# Patient Record
Sex: MALE | Race: WHITE | NOT HISPANIC OR LATINO | Employment: FULL TIME | URBAN - METROPOLITAN AREA
[De-identification: names, ages, dates, MRNs, and addresses within clinical notes are randomized per-mention and may not be internally consistent; named-entity substitution may affect disease eponyms.]

---

## 2019-09-16 ENCOUNTER — OFFICE VISIT (OUTPATIENT)
Dept: URGENT CARE | Facility: CLINIC | Age: 49
End: 2019-09-16
Payer: COMMERCIAL

## 2019-09-16 VITALS
OXYGEN SATURATION: 96 % | BODY MASS INDEX: 30.1 KG/M2 | SYSTOLIC BLOOD PRESSURE: 132 MMHG | WEIGHT: 215 LBS | HEART RATE: 82 BPM | TEMPERATURE: 98 F | RESPIRATION RATE: 19 BRPM | HEIGHT: 71 IN | DIASTOLIC BLOOD PRESSURE: 77 MMHG

## 2019-09-16 DIAGNOSIS — B34.9 VIRAL SYNDROME: Primary | ICD-10-CM

## 2019-09-16 PROCEDURE — 3008F BODY MASS INDEX DOCD: CPT | Mod: CPTII,S$GLB,, | Performed by: PHYSICIAN ASSISTANT

## 2019-09-16 PROCEDURE — 96372 PR INJECTION,THERAP/PROPH/DIAG2ST, IM OR SUBCUT: ICD-10-PCS | Mod: S$GLB,,, | Performed by: PHYSICIAN ASSISTANT

## 2019-09-16 PROCEDURE — 99213 PR OFFICE/OUTPT VISIT, EST, LEVL III, 20-29 MIN: ICD-10-PCS | Mod: 25,S$GLB,, | Performed by: PHYSICIAN ASSISTANT

## 2019-09-16 PROCEDURE — 99213 OFFICE O/P EST LOW 20 MIN: CPT | Mod: 25,S$GLB,, | Performed by: PHYSICIAN ASSISTANT

## 2019-09-16 PROCEDURE — 96372 THER/PROPH/DIAG INJ SC/IM: CPT | Mod: S$GLB,,, | Performed by: PHYSICIAN ASSISTANT

## 2019-09-16 PROCEDURE — 3008F PR BODY MASS INDEX (BMI) DOCUMENTED: ICD-10-PCS | Mod: CPTII,S$GLB,, | Performed by: PHYSICIAN ASSISTANT

## 2019-09-16 RX ORDER — ALBUTEROL SULFATE 90 UG/1
2 AEROSOL, METERED RESPIRATORY (INHALATION) EVERY 6 HOURS PRN
Qty: 8 G | Refills: 0 | Status: SHIPPED | OUTPATIENT
Start: 2019-09-16 | End: 2023-09-29

## 2019-09-16 RX ORDER — DEXAMETHASONE SODIUM PHOSPHATE 100 MG/10ML
10 INJECTION INTRAMUSCULAR; INTRAVENOUS ONCE
Status: COMPLETED | OUTPATIENT
Start: 2019-09-16 | End: 2019-09-16

## 2019-09-16 RX ORDER — AZELASTINE 1 MG/ML
2 SPRAY, METERED NASAL 2 TIMES DAILY
Qty: 30 ML | Refills: 0 | Status: SHIPPED | OUTPATIENT
Start: 2019-09-16 | End: 2020-09-15

## 2019-09-16 RX ORDER — AZITHROMYCIN 250 MG/1
TABLET, FILM COATED ORAL
Qty: 6 TABLET | Refills: 0 | Status: SHIPPED | OUTPATIENT
Start: 2019-09-16 | End: 2019-09-21

## 2019-09-16 RX ORDER — PANTOPRAZOLE SODIUM 40 MG/1
TABLET, DELAYED RELEASE ORAL
Refills: 12 | COMMUNITY
Start: 2019-07-12 | End: 2023-09-30 | Stop reason: ALTCHOICE

## 2019-09-16 RX ADMIN — DEXAMETHASONE SODIUM PHOSPHATE 10 MG: 100 INJECTION INTRAMUSCULAR; INTRAVENOUS at 08:09

## 2019-09-17 NOTE — PROGRESS NOTES
"Subjective:       Patient ID: Caesar Walsh is a 48 y.o. male.    Vitals:  height is 5' 11" (1.803 m) and weight is 97.5 kg (215 lb). His oral temperature is 98.1 °F (36.7 °C). His blood pressure is 132/77 and his pulse is 82. His respiration is 19 and oxygen saturation is 96%.     Chief Complaint: URI    URI    This is a new problem. The current episode started 1 to 4 weeks ago (1.5 weeks ). The problem has been unchanged. There has been no fever. Associated symptoms include congestion, coughing, headaches and sinus pain. Pertinent negatives include no abdominal pain, chest pain, diarrhea, dysuria, ear pain, joint pain, joint swelling, nausea, neck pain, plugged ear sensation, rash, rhinorrhea, sneezing, sore throat, swollen glands, vomiting or wheezing. Treatments tried: otc medications. The treatment provided no relief.       Constitution: Negative for chills, sweating, fatigue and fever.   HENT: Positive for congestion and sinus pain. Negative for ear pain, sinus pressure, sore throat and voice change.    Neck: Negative for neck pain and painful lymph nodes.   Cardiovascular: Negative for chest pain.   Eyes: Negative for eye redness.   Respiratory: Positive for cough. Negative for chest tightness, sputum production, bloody sputum, COPD, shortness of breath, stridor, wheezing and asthma.    Gastrointestinal: Negative for abdominal pain, nausea, vomiting and diarrhea.   Genitourinary: Negative for dysuria.   Musculoskeletal: Negative for muscle ache.   Skin: Negative for rash.   Allergic/Immunologic: Negative for seasonal allergies, asthma and sneezing.   Neurological: Positive for headaches.   Hematologic/Lymphatic: Negative for swollen lymph nodes.       Objective:      Physical Exam   Constitutional: He is oriented to person, place, and time. He appears well-developed and well-nourished. He is cooperative.  Non-toxic appearance. He does not appear ill. No distress.   Overall well-appearing and " nontoxic.  Resting comfortably on exam table.  Speaking in full sentences without pause or difficulty.  Infrequent nonproductive cough throughout exam.   HENT:   Head: Normocephalic and atraumatic.   Right Ear: Hearing, tympanic membrane, external ear and ear canal normal.   Left Ear: Hearing, tympanic membrane, external ear and ear canal normal.   Nose: No mucosal edema, rhinorrhea or nasal deformity. No epistaxis. Right sinus exhibits no maxillary sinus tenderness and no frontal sinus tenderness. Left sinus exhibits no maxillary sinus tenderness and no frontal sinus tenderness.   Mouth/Throat: Uvula is midline and mucous membranes are normal. No trismus in the jaw. Normal dentition. No uvula swelling. No posterior oropharyngeal erythema.   Nasal congestion, mild turbinate edema.    Mild posterior oropharyngeal erythema.   Eyes: Conjunctivae and lids are normal. Right eye exhibits no discharge. Left eye exhibits no discharge. No scleral icterus.   Sclera clear bilat   Neck: Trachea normal, normal range of motion, full passive range of motion without pain and phonation normal. Neck supple.   Cardiovascular: Normal rate, regular rhythm, normal heart sounds, intact distal pulses and normal pulses.   Pulmonary/Chest: Effort normal and breath sounds normal. No stridor. No respiratory distress. He has no wheezes. He exhibits no tenderness.   Abdominal: Soft. Normal appearance and bowel sounds are normal. He exhibits no distension, no pulsatile midline mass and no mass. There is no tenderness.   Musculoskeletal: Normal range of motion. He exhibits no edema or deformity.   Lymphadenopathy:     He has no cervical adenopathy.   Neurological: He is alert and oriented to person, place, and time. He exhibits normal muscle tone. Coordination normal.   Skin: Skin is warm, dry and intact. Capillary refill takes less than 2 seconds. He is not diaphoretic. No pallor.   Psychiatric: He has a normal mood and affect. His speech is  normal and behavior is normal. Judgment and thought content normal. Cognition and memory are normal.   Nursing note and vitals reviewed.      Assessment:       1. Viral syndrome        Plan:         Admits to infrequent wheeze. None during exam. Mild oropharyngeal erythema. Centor score -1. Likely viral etiology; may represent a bronchitis. I do not feel need for abx. Pt going out of town. Will give zpak to be used if no improvement in 2 days. Otherwise, supportive measures. IM steroid given hx wheeze, oropharyngeal erythema. Return precautions given.     Viral syndrome    Other orders  -     dexamethasone injection 10 mg  -     azithromycin (Z-BOBBY) 250 MG tablet; Take 2 tablets by mouth on day 1; Take 1 tablet by mouth on days 2-5  Dispense: 6 tablet; Refill: 0  -     azelastine (ASTELIN) 137 mcg (0.1 %) nasal spray; 2 sprays (274 mcg total) by Nasal route 2 (two) times daily.  Dispense: 30 mL; Refill: 0  -     albuterol (PROVENTIL HFA) 90 mcg/actuation inhaler; Inhale 2 puffs into the lungs every 6 (six) hours as needed for Wheezing. Rescue  Dispense: 8 g; Refill: 0  -     inhalation spacing device (BREATHERITE MDI SPACER); Use as directed for inhalation.  Dispense: 1 each; Refill: 0

## 2019-09-17 NOTE — PATIENT INSTRUCTIONS
"Drink lots of fluids, stay well hydrated. Tylenol/Ibuprofen as needed for discomfort; go back and forth between these two medications every 4 hrs as needed for temp greater than 100.4F, as needed for congestion/headache/body aches. Flonase and Astelin for congestion. Robitussin DM for cough. Albuterol as needed for wheeze, as needed for persistent cough. Only take Azithromycin if you feel symptoms have not improved over the next 2 days.    Follow-up with primary care provider for reevaluation, further recommendations. Return to this ED if unable to treat fever, if symptoms persist or worsen despite treatment, if you begin with shortness of breath or difficulty breathing, if any other problems occur.  Viral Syndrome (Adult)  A viral illness may cause a number of symptoms. The symptoms depend on the part of the body that the virus affects. If it settles in your nose, throat, and lungs, it may cause cough, sore throat, congestion, and sometimes headache. If it settles in your stomach and intestinal tract, it may cause vomiting and diarrhea. Sometimes it causes vague symptoms like "aching all over," feeling tired, loss of appetite, or fever.  A viral illness usually lasts 1 to 2 weeks, but sometimes it lasts longer. In some cases, a more serious infection can look like a viral syndrome in the first few days of the illness. You may need another exam and additional tests to know the difference. Watch for the warning signs listed below.  Home care  Follow these guidelines for taking care of yourself at home:  · If symptoms are severe, rest at home for the first 2 to 3 days.  · Stay away from cigarette smoke - both your smoke and the smoke from others.  · You may use over-the-counter acetaminophen or ibuprofen for fever, muscle aching, and headache, unless another medicine was prescribed for this. If you have chronic liver or kidney disease or ever had a stomach ulcer or GI bleeding, talk with your doctor before using these " medicines. No one who is younger than 18 and ill with a fever should take aspirin. It may cause severe disease or death.  · Your appetite may be poor, so a light diet is fine. Avoid dehydration by drinking 8 to 12 8-ounce glasses of fluids each day. This may include water; orange juice; lemonade; apple, grape, and cranberry juice; clear fruit drinks; electrolyte replacement and sports drinks; and decaffeinated teas and coffee. If you have been diagnosed with a kidney disease, ask your doctor how much and what types of fluids you should drink to prevent dehydration. If you have kidney disease, drinking too much fluid can cause it build up in the your body and be dangerous to your health.  · Over-the-counter remedies won't shorten the length of the illness but may be helpful for cough, sore throat; and nasal and sinus congestion. Don't use decongestants if you have high blood pressure.  Follow-up care  Follow up with your healthcare provider if you do not improve over the next week.  Call 911  Get emergency medical care if any of the following occur:  · Convulsion  · Feeling weak, dizzy, or like you are going to faint  · Chest pain, shortness of breath, wheezing, or difficulty breathing  When to seek medical advice  Call your healthcare provider right away if any of these occur:  · Cough with lots of colored sputum (mucus) or blood in your sputum  · Chest pain, shortness of breath, wheezing, or difficulty breathing  · Severe headache; face, neck, or ear pain  · Severe, constant pain in the lower right side of your belly (abdominal)  · Continued vomiting (cant keep liquids down)  · Frequent diarrhea (more than 5 times a day); blood (red or black color) or mucus in diarrhea  · Feeling weak, dizzy, or like you are going to faint  · Extreme thirst  · Fever of 100.4°F (38°C) or higher, or as directed by your healthcare provider  Date Last Reviewed: 9/25/2015  © 8298-7686 The "[employer]. [address] [place] [address],  CAROLINE Nelson [address]. All rights reserved. This information is not intended as a substitute for professional medical care. Always follow your healthcare professional's instructions.        Bronchitis, Viral (Adult)    You have a viral bronchitis. Bronchitis is inflammation and swelling of the lining of the lungs. This is often caused by an infection. Symptoms include a dry, hacking cough that is worse at night. The cough may bring up yellow-green mucus. You may also feel short of breath or wheeze. Other symptoms may include tiredness, chest discomfort, and chills.  Bronchitis that is caused by a virus is not treated with antibiotics. Instead, medicines may be given to help relieve symptoms. Symptoms can last up to 2 weeks, although the cough may last much longer.  This illness is contagious during the first few days and is spread through the air by coughing and sneezing, or by direct contact (touching the sick person and then touching your own eyes, nose, or mouth).  Most viral illnesses resolve within 10 to 14 days with rest and simple home remedies, although they may sometimes last for several weeks.  Home care  · If symptoms are severe, rest at home for the first 2 to 3 days. When you go back to your usual activities, don't let yourself get too tired.  · Do not smoke. Also avoid being exposed to secondhand smoke.  · You may use over-the-counter medicine to control fever or pain, unless another pain medicine was prescribed. (Note: If you have chronic liver or kidney disease or have ever had a stomach ulcer or gastrointestinal bleeding, talk with your healthcare provider before using these medicines. Also talk to your provider if you are taking medicine to prevent blood clots.) Aspirin should never be given to anyone younger than 18 years of age who is ill with a viral infection or fever. It may cause severe liver or brain damage.  · Your appetite may be poor, so a light diet is fine. Avoid dehydration by drinking 6  to 8 glasses of fluids per day (such as water, soft drinks, sports drinks, juices, tea, or soup). Extra fluids will help loosen secretions in the nose and lungs.  · Over-the-counter cough, cold, and sore-throat medicines will not shorten the length of the illness, but they may help to reduce symptoms. (Note: Do not use decongestants if you have high blood pressure.)  Follow-up care  Follow up with your healthcare provider, or as advised. If you had an X-ray or ECG (electrocardiogram), a specialist will review it. You will be notified of any new findings that may affect your care.  Note: If you are age 65 or older, or if you have a chronic lung disease or condition that affects your immune system, or you smoke, talk to your healthcare provider about having pneumococcal vaccinations and a yearly influenza vaccination (flu shot).  When to seek medical advice  Call your healthcare provider right away if any of these occur:  · Fever of 100.4°F (38°C) or higher  · Coughing up increased amounts of colored sputum  · Weakness, drowsiness, headache, facial pain, ear pain, or a stiff neck  Call 911, or get immediate medical care  Contact emergency services right away if any of these occur:  · Coughing up blood  · Worsening weakness, drowsiness, headache, or stiff neck  · Trouble breathing, wheezing, or pain with breathing  Date Last Reviewed: 9/13/2015  © 3898-9339 [employer]. [address] [place] [address]. All rights reserved. This information is not intended as a substitute for professional medical care. Always follow your healthcare professional's instructions.        Step-by-Step  Using an Inhaler with a Spacer    Date Last Reviewed: 2/1/2017  © 9513-6040 [employer]. [address] [place] [address]. All rights reserved. This information is not intended as a substitute for professional medical care. Always follow your healthcare professional's instructions.

## 2019-10-13 RX ORDER — ALBUTEROL SULFATE 90 UG/1
AEROSOL, METERED RESPIRATORY (INHALATION)
Qty: 8.5 G | Refills: 0 | OUTPATIENT
Start: 2019-10-13

## 2023-09-29 ENCOUNTER — HOSPITAL ENCOUNTER (OUTPATIENT)
Facility: OTHER | Age: 53
Discharge: HOME | End: 2023-09-30
Attending: EMERGENCY MEDICINE | Admitting: EMERGENCY MEDICINE
Payer: COMMERCIAL

## 2023-09-29 DIAGNOSIS — R07.9 CHEST PAIN: Primary | ICD-10-CM

## 2023-09-29 DIAGNOSIS — R06.02 SHORTNESS OF BREATH: ICD-10-CM

## 2023-09-29 LAB
ALBUMIN SERPL BCP-MCNC: 4.4 G/DL (ref 3.5–5.2)
ALP SERPL-CCNC: 51 U/L (ref 55–135)
ALT SERPL W/O P-5'-P-CCNC: 67 U/L (ref 10–44)
ANION GAP SERPL CALC-SCNC: 13 MMOL/L (ref 8–16)
AST SERPL-CCNC: 41 U/L (ref 10–40)
BASOPHILS # BLD AUTO: 0.07 K/UL (ref 0–0.2)
BASOPHILS NFR BLD: 0.8 % (ref 0–1.9)
BILIRUB SERPL-MCNC: 0.2 MG/DL (ref 0.1–1)
BNP SERPL-MCNC: 11 PG/ML (ref 0–99)
BUN SERPL-MCNC: 15 MG/DL (ref 6–20)
CALCIUM SERPL-MCNC: 9.5 MG/DL (ref 8.7–10.5)
CHLORIDE SERPL-SCNC: 105 MMOL/L (ref 95–110)
CHOLEST SERPL-MCNC: 164 MG/DL (ref 120–199)
CHOLEST/HDLC SERPL: 4.3 {RATIO} (ref 2–5)
CO2 SERPL-SCNC: 20 MMOL/L (ref 23–29)
CREAT SERPL-MCNC: 0.9 MG/DL (ref 0.5–1.4)
D DIMER PPP IA.FEU-MCNC: 0.19 MG/L FEU
DIFFERENTIAL METHOD: ABNORMAL
EOSINOPHIL # BLD AUTO: 0.3 K/UL (ref 0–0.5)
EOSINOPHIL NFR BLD: 3.2 % (ref 0–8)
ERYTHROCYTE [DISTWIDTH] IN BLOOD BY AUTOMATED COUNT: 13.2 % (ref 11.5–14.5)
EST. GFR  (NO RACE VARIABLE): >60 ML/MIN/1.73 M^2
GLUCOSE SERPL-MCNC: 144 MG/DL (ref 70–110)
HCT VFR BLD AUTO: 45.2 % (ref 40–54)
HDLC SERPL-MCNC: 38 MG/DL (ref 40–75)
HDLC SERPL: 23.2 % (ref 20–50)
HGB BLD-MCNC: 15 G/DL (ref 14–18)
IMM GRANULOCYTES # BLD AUTO: 0.06 K/UL (ref 0–0.04)
IMM GRANULOCYTES NFR BLD AUTO: 0.7 % (ref 0–0.5)
LDLC SERPL CALC-MCNC: 49.2 MG/DL (ref 63–159)
LYMPHOCYTES # BLD AUTO: 2.1 K/UL (ref 1–4.8)
LYMPHOCYTES NFR BLD: 24.9 % (ref 18–48)
MCH RBC QN AUTO: 29.4 PG (ref 27–31)
MCHC RBC AUTO-ENTMCNC: 33.2 G/DL (ref 32–36)
MCV RBC AUTO: 89 FL (ref 82–98)
MONOCYTES # BLD AUTO: 0.7 K/UL (ref 0.3–1)
MONOCYTES NFR BLD: 8 % (ref 4–15)
NEUTROPHILS # BLD AUTO: 5.3 K/UL (ref 1.8–7.7)
NEUTROPHILS NFR BLD: 62.4 % (ref 38–73)
NONHDLC SERPL-MCNC: 126 MG/DL
NRBC BLD-RTO: 0 /100 WBC
PLATELET # BLD AUTO: 371 K/UL (ref 150–450)
PMV BLD AUTO: 8.6 FL (ref 9.2–12.9)
POTASSIUM SERPL-SCNC: 4 MMOL/L (ref 3.5–5.1)
PROT SERPL-MCNC: 7.8 G/DL (ref 6–8.4)
RBC # BLD AUTO: 5.1 M/UL (ref 4.6–6.2)
SODIUM SERPL-SCNC: 138 MMOL/L (ref 136–145)
TRIGL SERPL-MCNC: 384 MG/DL (ref 30–150)
TROPONIN I SERPL DL<=0.01 NG/ML-MCNC: <0.006 NG/ML (ref 0–0.03)
TROPONIN I SERPL DL<=0.01 NG/ML-MCNC: <0.006 NG/ML (ref 0–0.03)
WBC # BLD AUTO: 8.5 K/UL (ref 3.9–12.7)

## 2023-09-29 PROCEDURE — 25000003 PHARM REV CODE 250: Performed by: NURSE PRACTITIONER

## 2023-09-29 PROCEDURE — 84484 ASSAY OF TROPONIN QUANT: CPT | Mod: 91 | Performed by: NURSE PRACTITIONER

## 2023-09-29 PROCEDURE — 93010 EKG 12-LEAD: ICD-10-PCS | Mod: ,,, | Performed by: INTERNAL MEDICINE

## 2023-09-29 PROCEDURE — 99236 PR OBSERV/HOSP SAME DATE,LEVL V: ICD-10-PCS | Mod: ,,, | Performed by: INTERNAL MEDICINE

## 2023-09-29 PROCEDURE — 83880 ASSAY OF NATRIURETIC PEPTIDE: CPT | Performed by: NURSE PRACTITIONER

## 2023-09-29 PROCEDURE — 85379 FIBRIN DEGRADATION QUANT: CPT | Performed by: NURSE PRACTITIONER

## 2023-09-29 PROCEDURE — 85025 COMPLETE CBC W/AUTO DIFF WBC: CPT | Performed by: NURSE PRACTITIONER

## 2023-09-29 PROCEDURE — 99236 HOSP IP/OBS SAME DATE HI 85: CPT | Mod: ,,, | Performed by: INTERNAL MEDICINE

## 2023-09-29 PROCEDURE — 36415 COLL VENOUS BLD VENIPUNCTURE: CPT | Performed by: NURSE PRACTITIONER

## 2023-09-29 PROCEDURE — 93005 ELECTROCARDIOGRAM TRACING: CPT

## 2023-09-29 PROCEDURE — 84484 ASSAY OF TROPONIN QUANT: CPT | Performed by: NURSE PRACTITIONER

## 2023-09-29 PROCEDURE — 94761 N-INVAS EAR/PLS OXIMETRY MLT: CPT

## 2023-09-29 PROCEDURE — G0378 HOSPITAL OBSERVATION PER HR: HCPCS

## 2023-09-29 PROCEDURE — 80053 COMPREHEN METABOLIC PANEL: CPT | Performed by: NURSE PRACTITIONER

## 2023-09-29 PROCEDURE — 99285 EMERGENCY DEPT VISIT HI MDM: CPT | Mod: 25

## 2023-09-29 PROCEDURE — 93010 ELECTROCARDIOGRAM REPORT: CPT | Mod: ,,, | Performed by: INTERNAL MEDICINE

## 2023-09-29 PROCEDURE — 80061 LIPID PANEL: CPT | Performed by: NURSE PRACTITIONER

## 2023-09-29 RX ORDER — METFORMIN HYDROCHLORIDE 500 MG/1
500 TABLET ORAL 2 TIMES DAILY WITH MEALS
COMMUNITY

## 2023-09-29 RX ORDER — ACETAMINOPHEN 325 MG/1
650 TABLET ORAL EVERY 6 HOURS PRN
Status: DISCONTINUED | OUTPATIENT
Start: 2023-09-29 | End: 2023-09-30 | Stop reason: HOSPADM

## 2023-09-29 RX ORDER — ONDANSETRON 2 MG/ML
4 INJECTION INTRAMUSCULAR; INTRAVENOUS EVERY 8 HOURS PRN
Status: DISCONTINUED | OUTPATIENT
Start: 2023-09-29 | End: 2023-09-30 | Stop reason: HOSPADM

## 2023-09-29 RX ORDER — MAG HYDROX/ALUMINUM HYD/SIMETH 200-200-20
30 SUSPENSION, ORAL (FINAL DOSE FORM) ORAL ONCE
Status: COMPLETED | OUTPATIENT
Start: 2023-09-29 | End: 2023-09-29

## 2023-09-29 RX ORDER — SODIUM CHLORIDE 0.9 % (FLUSH) 0.9 %
10 SYRINGE (ML) INJECTION
Status: DISCONTINUED | OUTPATIENT
Start: 2023-09-29 | End: 2023-09-30 | Stop reason: HOSPADM

## 2023-09-29 RX ORDER — FAMOTIDINE 20 MG/1
40 TABLET, FILM COATED ORAL
Status: COMPLETED | OUTPATIENT
Start: 2023-09-29 | End: 2023-09-29

## 2023-09-29 RX ORDER — ATORVASTATIN CALCIUM 20 MG/1
80 TABLET, FILM COATED ORAL DAILY
Status: DISCONTINUED | OUTPATIENT
Start: 2023-09-30 | End: 2023-09-30 | Stop reason: HOSPADM

## 2023-09-29 RX ORDER — ROSUVASTATIN CALCIUM 20 MG/1
20 TABLET, COATED ORAL
COMMUNITY
Start: 2023-07-11 | End: 2023-09-30

## 2023-09-29 RX ORDER — KETOROLAC TROMETHAMINE 30 MG/ML
10 INJECTION, SOLUTION INTRAMUSCULAR; INTRAVENOUS EVERY 8 HOURS PRN
Status: DISCONTINUED | OUTPATIENT
Start: 2023-09-29 | End: 2023-09-30 | Stop reason: HOSPADM

## 2023-09-29 RX ORDER — DIPHENHYDRAMINE HYDROCHLORIDE 50 MG/ML
25 INJECTION INTRAMUSCULAR; INTRAVENOUS NIGHTLY PRN
Status: DISCONTINUED | OUTPATIENT
Start: 2023-09-29 | End: 2023-09-30 | Stop reason: HOSPADM

## 2023-09-29 RX ORDER — TALC
6 POWDER (GRAM) TOPICAL NIGHTLY PRN
Status: DISCONTINUED | OUTPATIENT
Start: 2023-09-29 | End: 2023-09-30 | Stop reason: HOSPADM

## 2023-09-29 RX ORDER — PANTOPRAZOLE SODIUM 40 MG/1
40 TABLET, DELAYED RELEASE ORAL DAILY
Status: DISCONTINUED | OUTPATIENT
Start: 2023-09-30 | End: 2023-09-30 | Stop reason: HOSPADM

## 2023-09-29 RX ORDER — LIDOCAINE HYDROCHLORIDE 20 MG/ML
15 SOLUTION OROPHARYNGEAL ONCE
Status: COMPLETED | OUTPATIENT
Start: 2023-09-29 | End: 2023-09-29

## 2023-09-29 RX ORDER — HYDROCODONE BITARTRATE AND ACETAMINOPHEN 5; 325 MG/1; MG/1
1 TABLET ORAL EVERY 8 HOURS PRN
Status: DISCONTINUED | OUTPATIENT
Start: 2023-09-29 | End: 2023-09-30 | Stop reason: HOSPADM

## 2023-09-29 RX ADMIN — LIDOCAINE HYDROCHLORIDE 15 ML: 20 SOLUTION ORAL; TOPICAL at 09:09

## 2023-09-29 RX ADMIN — FAMOTIDINE 40 MG: 20 TABLET, FILM COATED ORAL at 09:09

## 2023-09-29 RX ADMIN — ALUMINUM HYDROXIDE, MAGNESIUM HYDROXIDE, AND SIMETHICONE 30 ML: 200; 200; 20 SUSPENSION ORAL at 09:09

## 2023-09-29 NOTE — ED PROVIDER NOTES
"Source of History:  Patient    Chief complaint:  Chest Pain (Pt reporting constant mid-sternal chest pain x 2 weeks as well as feeling winded and L arm numbness noticed today. Denies personal cardiac hx but reports family cardiac hx. )      HPI:  Caesar Walsh is a 52 y.o. male presenting with intermittent midsternal chest pain that began 2 weeks ago, described as dull sensation.  Worse over the past few days which prompted him to present to the emergency department.  States that he had a URI recently and had mild shortness breath but that has resolved.  Pain does not radiate down his arm or to his back.  He denies any nausea vomiting diarrhea or any fever/chills.  Past medical history includes hyperlipidemia, prediabetes.  Dad also had a quadruple bypass at 48 years old.  Hypertensive on arrival, no past medical history of hypertension    This is the extent to the patients complaints today here in the emergency department.    PMH:  As per HPI and below:  History reviewed. No pertinent past medical history.  History reviewed. No pertinent surgical history.    Social History     Tobacco Use    Smoking status: Never    Smokeless tobacco: Never   Substance Use Topics    Alcohol use: Never    Drug use: Never       Review of patient's allergies indicates:   Allergen Reactions    Gluten protein        ROS: As per HPI and below:  Constitutional: No fever.  No chills.  Eyes: No visual changes.  ENT: No sore throat. No ear pain.  Cardiovascular:  Chest pain  Respiratory:  No shortness of breath  GI: No abdominal pain.  No nausea or vomiting.  Genitourinary: No abnormal urination.  Neurologic: No headache. No focal weakness.  No numbness.  MSK: no back pain.  Integument: No rashes or lesions.  Hematologic: No easy bruising.  Endocrine: No excessive thirst or urination.    Physical Exam:    BP (!) 156/82   Pulse 77   Temp 98.2 °F (36.8 °C) (Oral)   Resp (!) 24   Ht 5' 11" (1.803 m)   Wt 95.3 kg (210 lb)   " "SpO2 95%   BMI 29.29 kg/m²   Vitals:    09/29/23 1420 09/29/23 1432 09/29/23 1439 09/29/23 1502   BP: (!) 155/107 (!) 197/109 (!) 175/87 (!) 161/86   Pulse: 90 86 81 79   Resp: 19 (!) 25 19 20   Temp: 98.2 °F (36.8 °C)      TempSrc: Oral      SpO2: 95% 97% 97% 95%   Weight: 95.3 kg (210 lb)      Height: 5' 11" (1.803 m)       09/29/23 1602   BP: (!) 156/82   Pulse: 77   Resp: (!) 24   Temp:    TempSrc:    SpO2: 95%   Weight:    Height:        Nursing note and vital signs reviewed.  Constitutional: No acute distress.  Well-appearing, non-toxic.  Eyes: No conjunctival injection.  Extraocular muscles are intact.  ENT: Oropharynx clear.   Cardiovascular:  Regular rate and rhythm no murmurs gallops or rubs.  Chest/ Respiratory:  Clear to auscultation bilaterally without wheezing rhonchi or rales.  No chest wall tenderness, crepitus, bruising.  Chest pain not reproducible with movement or palpation.  Abdomen: Soft.  Not distended.  Nontender.  No guarding.  No rebound. Non-peritoneal.  Musculoskeletal: Good range of motion all joints.  No deformities.  Neck supple.  No meningismus.  Skin: No rashes seen.  Good turgor.  No abrasions.  No ecchymoses.  Neuro: alert and oriented x3,  no focal neurological deficits.  Psych: Appropriate, conversant    Initial MDM:  52-year-old male with hyperlipidemia and significant cardiac family history presented for mid chest pain that began 2 weeks ago intermittent initially however worsening over the past few days.  On exam he is well-appearing.  Chest pain is not reproducible with movement or palpation.  It is not pleuritic in nature.  Plan for cardiac workup.    Labs that have been ordered have been independently reviewed and interpreted by myself.    Labs Reviewed   CBC W/ AUTO DIFFERENTIAL - Abnormal; Notable for the following components:       Result Value    MPV 8.6 (*)     Immature Granulocytes 0.7 (*)     Immature Grans (Abs) 0.06 (*)     All other components within normal limits "   COMPREHENSIVE METABOLIC PANEL - Abnormal; Notable for the following components:    CO2 20 (*)     Glucose 144 (*)     Alkaline Phosphatase 51 (*)     AST 41 (*)     ALT 67 (*)     All other components within normal limits   TROPONIN I   B-TYPE NATRIURETIC PEPTIDE   LIPID PANEL       X-Ray Chest AP Portable   Final Result      No acute cardiopulmonary process seen.         Electronically signed by: Jami Mansfield   Date:    09/29/2023   Time:    14:54          No results found for this or any previous visit.    Differential Diagnosis:  Differential Diagnosis includes, but is not limited to:  ACS/MI, PE, aortic dissection, pneumothorax, cardiac tamponade, pericarditis/myocarditis, pneumonia, infection/abscess, lung mass, costochondritis/pleurisy, GERD, biliary disease, pancreatitis    MDM  52 y.o. male with chest pain that began 2 weeks ago intermittent in nature however worsening over the past few days.  On exam well-appearing.  Pain not reproducible.  EKG normal sinus without any evidence of ischemia.  Initial troponin normal.  No leukocytosis, stable H&H.  No significant electrolyte abnormalities.  Chest x-ray negative.  I spoke with on-call Cardiology who recommended an ECHO in the AM.  Patient will be admitted to the [facility]OU for further treatment and monitoring.      ED Course as of 09/29/23 1622   Fri Sep 29, 2023   1502 WBC: 8.50 [AS]   1502 Hemoglobin: 15.0 [AS]   1502 Hematocrit: 45.2 [AS]      ED Course User Index  [AS] Kiarra Montalvo FNP       Diagnostic Impression:    1. Shortness of breath    2. Chest pain                  Kiarra Montalvo, MEGAN  09/29/23 1622

## 2023-09-29 NOTE — H&P
[facility] ED Observation Unit  History and Physical      I assumed care of this patient from the Emergency Department at onset of observation time, 4:30PM on 09/29/2023.       History of Present Illness:  Caesar Walsh is a 52 y.o. male presenting with intermittent midsternal chest pain that began 2 weeks ago, described as dull sensation.  Worse over the past few days which prompted him to present to the emergency department.  States that he had a URI recently and had mild shortness breath but that has resolved.  Pain does not radiate down his arm or to his back.  He denies any nausea vomiting diarrhea or any fever/chills.  Past medical history includes hyperlipidemia, prediabetes.  Dad also had a quadruple bypass at 48 years old.  Hypertensive on arrival, no past medical history of hypertension     ED Course:  EKG normal sinus without any evidence of ischemia.  Initial troponin normal.  No leukocytosis, stable H&H.  No significant electrolyte abnormalities.  Chest x-ray negative.  I spoke with on-call Cardiology who recommended an ECHO in the AM.  Patient will be admitted to the EDOU for further treatment and monitoring.      Patient mentions that he had a sharp pain in his leg last week preceding the chest pain. Will get a Ddimer with plan for CTA if positive,. Ddimer normal.    I reviewed the ED Provider Note dated 09/29/2023 prior to my evaluation of this patient.  I reviewed all labs and imaging performed in the Main ED, prior to patient being placed in Observation. Patient was placed in the ED Observation Unit for chest pain rule out.    PMHx   History reviewed. No pertinent past medical history.   History reviewed. No pertinent surgical history.     Family Hx   History reviewed. No pertinent family history.     Social Hx   Social History     Socioeconomic History    Marital status: Single   Tobacco Use    Smoking status: Never    Smokeless tobacco: Never   Substance and Sexual Activity     Alcohol use: Never    Drug use: Never        Vital Signs   Vitals:    09/29/23 1602 09/29/23 1702 09/29/23 1757 09/29/23 2011   BP: (!) 156/82 (!) 151/80  128/74   Pulse: 77 75 76 68   Resp: (!) 24 (!) 21  18   Temp:    97.6 °F (36.4 °C)   TempSrc:    Oral   SpO2: 95% 96%  97%   Weight:       Height:        09/29/23 2014   BP:    Pulse:    Resp:    Temp:    TempSrc:    SpO2: 97%   Weight:    Height:        Review of Systems  Review of Systems   Constitutional:  Negative for chills, fever and malaise/fatigue.   Respiratory:  Negative for cough and shortness of breath.    Cardiovascular:  Positive for chest pain. Negative for palpitations and leg swelling.   Gastrointestinal:  Negative for abdominal pain, nausea and vomiting.   Genitourinary:  Negative for dysuria.   Musculoskeletal:  Negative for back pain and myalgias.   Skin:  Negative for rash.   Neurological:  Negative for dizziness and headaches.   Psychiatric/Behavioral:  Negative for depression and suicidal ideas.        Brief Physical Exam/Reassessment   Physical Exam    Constitutional: He appears well-developed and well-nourished. He is cooperative.  Non-toxic appearance. No distress.   HENT:   Head: Normocephalic and atraumatic.   Eyes: EOM are normal. Pupils are equal, round, and reactive to light. No scleral icterus.   Neck:   Normal range of motion.  Cardiovascular:  Normal rate, regular rhythm and normal heart sounds.           Pulmonary/Chest: Breath sounds normal. No respiratory distress. He exhibits no tenderness.   Abdominal: Abdomen is soft. Bowel sounds are normal. He exhibits no distension. There is no abdominal tenderness.   Musculoskeletal:         General: No tenderness. Normal range of motion.      Cervical back: Normal range of motion.     Neurological: He is alert and oriented to person, place, and time. He has normal strength. No sensory deficit. GCS score is 15. GCS eye subscore is 4. GCS verbal subscore is 5. GCS motor subscore is 6.    Skin: Skin is warm and dry. Capillary refill takes less than 2 seconds. No erythema.   Psychiatric: He has a normal mood and affect. Thought content normal.         Labs Reviewed   CBC W/ AUTO DIFFERENTIAL - Abnormal; Notable for the following components:       Result Value    MPV 8.6 (*)     Immature Granulocytes 0.7 (*)     Immature Grans (Abs) 0.06 (*)     All other components within normal limits   COMPREHENSIVE METABOLIC PANEL - Abnormal; Notable for the following components:    CO2 20 (*)     Glucose 144 (*)     Alkaline Phosphatase 51 (*)     AST 41 (*)     ALT 67 (*)     All other components within normal limits   LIPID PANEL - Abnormal; Notable for the following components:    Triglycerides 384 (*)     HDL 38 (*)     LDL Cholesterol 49.2 (*)     All other components within normal limits   TROPONIN I   B-TYPE NATRIURETIC PEPTIDE   TROPONIN I   D DIMER, QUANTITATIVE     X-Ray Chest AP Portable   Final Result      No acute cardiopulmonary process seen.         Electronically signed by: Jami Mansfield   Date:    09/29/2023   Time:    14:54            Medications:   Scheduled Meds:   aluminum-magnesium hydroxide-simethicone  30 mL Oral Once    And    LIDOcaine HCl 2%  15 mL Oral Once    famotidine  40 mg Oral ED 1 Time     Continuous Infusions:  PRN Meds:.melatonin, sodium chloride 0.9%      Assessment and Plan:     Chest Pain              9/20/2023: Began experience chest pain.              9/29/2023: Episode of central chest pain.              ECG WNL. Troponin x 1 negative.              Pain appears atypical but in setting of several risk factors.              Follow ECG's and troponin.              Do Echo.     2. Hypercholesterolemia              2020: Statin was begun.     3. Prediabetes              2022: Diagnosed.     4. Family History for Ischemic Heart Disease    Father underwent CABG at age 48.      Case was discussed with the ED provider, Kiarra Montalvo NP and note reviewed by Dr. Quintero

## 2023-09-29 NOTE — Clinical Note
Diagnosis: Chest pain [851716]   Future Attending Provider: KILLIAN DAVIS II [[id number]]   Is the patient being sent to ED Observation?: Yes   Admitting Provider:: KILLIAN DAVIS II [[id number]]

## 2023-09-29 NOTE — CONSULTS
[facility] Emergency Dept  Cardiology  Consult Note    Patient Name: Caesar Walsh  MRN: [id number]  Admission Date: 9/29/2023  Hospital Length of Stay: 0 days  Code Status: No Order   Attending Provider: Ildefonso Richter II, MD   Consulting Provider: Dominick Quintero MD  Primary Care Physician: Isaac Lamb MD  Principal Problem:<principal problem not specified>    Patient information was obtained from patient, ER records, and primary team.     Inpatient consult to Cardiology  Consult performed by: Dominick Quintero MD  Consult ordered by: Kiarra Montalvo FNP  Reason for consult: Chest pain        Subjective:     Chief Complaint: Chest pain.     HPI:    Caesar Walsh is a 52 y.o.male with hypercholesterolemia and prediabetes. He is overweight. He had an upper respiratory infection in early 9/2023. On about 9/20/2023 he began experience occasional left as well right sided brief episodes of chest pain. The pain could come on at any time and did not appear related to activities. He exercised on 9/26/2023 and felt well. Today on 9/29/2023 he began experience burning in his central chest after a meal. He became concerned that the pain was coming from his heart and presented to the emergency room. He was hypertensive on arrival.        History reviewed. No pertinent past medical history.    History reviewed. No pertinent surgical history.    Review of patient's allergies indicates:   Allergen Reactions    Gluten protein        No current facility-administered medications on file prior to encounter.     Current Outpatient Medications on File Prior to Encounter   Medication Sig    albuterol (PROVENTIL HFA) 90 mcg/actuation inhaler Inhale 2 puffs into the lungs every 6 (six) hours as needed for Wheezing. Rescue    metFORMIN (GLUCOPHAGE) 500 MG tablet Take 500 mg by mouth 2 (two) times daily with meals.    pantoprazole (PROTONIX) 40 MG tablet TK 1 T PO  QD    rosuvastatin (CRESTOR) 20 MG tablet  Take 20 mg by mouth.    azelastine (ASTELIN) 137 mcg (0.1 %) nasal spray 2 sprays (274 mcg total) by Nasal route 2 (two) times daily.    inhalation spacing device (BREATHERITE MDI SPACER) Use as directed for inhalation.     Family History    None       Tobacco Use    Smoking status: Never    Smokeless tobacco: Never   Substance and Sexual Activity    Alcohol use: Never    Drug use: Never    Sexual activity: Not on file     Review of Systems   Constitutional: Negative for chills, fever and malaise/fatigue.   HENT:  Negative for nosebleeds and tinnitus.    Eyes:  Negative for double vision, vision loss in left eye and vision loss in right eye.   Cardiovascular:  Positive for chest pain. Negative for claudication, dyspnea on exertion, irregular heartbeat, leg swelling, near-syncope, orthopnea, palpitations, paroxysmal nocturnal dyspnea and syncope.   Respiratory:  Negative for cough, hemoptysis, shortness of breath and wheezing.    Endocrine: Negative for cold intolerance and heat intolerance.   Hematologic/Lymphatic: Negative for bleeding problem. Does not bruise/bleed easily.   Skin:  Negative for color change and rash.   Musculoskeletal:  Negative for back pain, falls, muscle weakness and myalgias.   Gastrointestinal:  Negative for abdominal pain, diarrhea, dysphagia, heartburn, hematemesis, hematochezia, hemorrhoids, jaundice, melena, nausea and vomiting.   Genitourinary:  Negative for dysuria and hematuria.   Neurological:  Negative for dizziness, focal weakness, headaches, light-headedness, loss of balance, numbness, tremors, vertigo and weakness.   Psychiatric/Behavioral:  Negative for altered mental status, depression and memory loss. The patient is not nervous/anxious.    Allergic/Immunologic: Negative for hives and persistent infections.     Objective:     Vital Signs (Most Recent):  Temp: 98.2 °F (36.8 °C) (09/29/23 1420)  Pulse: 75 (09/29/23 1702)  Resp: (!) 21 (09/29/23 1702)  BP: (!) 151/80 (09/29/23  1702)  SpO2: 96 % (09/29/23 1702) Vital Signs (24h Range):  Temp:  [98.2 °F (36.8 °C)] 98.2 °F (36.8 °C)  Pulse:  [75-90] 75  Resp:  [19-25] 21  SpO2:  [95 %-97 %] 96 %  BP: (151-197)/() 151/80     Weight: 95.3 kg (210 lb)  Body mass index is 29.29 kg/m².    SpO2: 96 %       No intake or output data in the 24 hours ending 09/29/23 1738    Lines/Drains/Airways       Peripheral Intravenous Line  Duration                  Peripheral IV - Single Lumen 09/29/23 1438 20 G Left Antecubital <1 day                    Physical Exam  Constitutional:       General: He is not in acute distress.     Appearance: Normal appearance. He is well-developed. He is not toxic-appearing or diaphoretic.   HENT:      Head: Normocephalic and atraumatic.      Nose: Nose normal.   Eyes:      General:         Right eye: No discharge.         Left eye: No discharge.      Conjunctiva/sclera:      Right eye: Right conjunctiva is not injected.      Left eye: Left conjunctiva is not injected.      Pupils: Pupils are equal.      Right eye: Pupil is round.      Left eye: Pupil is round.   Neck:      Thyroid: No thyromegaly.      Vascular: No carotid bruit or JVD.   Cardiovascular:      Rate and Rhythm: Normal rate and regular rhythm. No extrasystoles are present.     Chest Wall: PMI is not displaced.      Pulses:           Radial pulses are 2+ on the right side and 2+ on the left side.        Femoral pulses are 2+ on the right side and 2+ on the left side.       Dorsalis pedis pulses are 2+ on the right side and 2+ on the left side.        Posterior tibial pulses are 2+ on the right side and 2+ on the left side.      Heart sounds: S1 normal and S2 normal.      No gallop.   Pulmonary:      Effort: Pulmonary effort is normal.      Breath sounds: Normal breath sounds.   Abdominal:      Palpations: Abdomen is soft.      Tenderness: There is no abdominal tenderness.   Musculoskeletal:      Cervical back: Neck supple.      Right lower leg: Normal. No  swelling. No edema.      Left lower leg: Normal. No swelling. No edema.   Lymphadenopathy:      Head:      Right side of head: No submandibular adenopathy.      Left side of head: No submandibular adenopathy.      Cervical: No cervical adenopathy.   Skin:     General: Skin is warm and dry.      Findings: No rash.      Nails: There is no clubbing.   Neurological:      General: No focal deficit present.      Mental Status: He is alert and oriented to person, place, and time. He is not disoriented.      Cranial Nerves: No cranial nerve deficit.   Psychiatric:         Attention and Perception: Attention and perception normal.         Mood and Affect: Mood and affect normal.         Speech: Speech normal.         Behavior: Behavior normal.         Thought Content: Thought content normal.         Cognition and Memory: Cognition and memory normal.         Judgment: Judgment normal.         Current Medications:      Current Laboratory Results:    Recent Results (from the past 24 hour(s))   CBC auto differential    Collection Time: 09/29/23  2:42 PM   Result Value Ref Range    WBC 8.50 3.90 - 12.70 K/uL    RBC 5.10 4.60 - 6.20 M/uL    Hemoglobin 15.0 14.0 - 18.0 g/dL    Hematocrit 45.2 40.0 - 54.0 %    MCV 89 82 - 98 fL    MCH 29.4 27.0 - 31.0 pg    MCHC 33.2 32.0 - 36.0 g/dL    RDW 13.2 11.5 - 14.5 %    Platelets 371 150 - 450 K/uL    MPV 8.6 (L) 9.2 - 12.9 fL    Immature Granulocytes 0.7 (H) 0.0 - 0.5 %    Gran # (ANC) 5.3 1.8 - 7.7 K/uL    Immature Grans (Abs) 0.06 (H) 0.00 - 0.04 K/uL    Lymph # 2.1 1.0 - 4.8 K/uL    Mono # 0.7 0.3 - 1.0 K/uL    Eos # 0.3 0.0 - 0.5 K/uL    Baso # 0.07 0.00 - 0.20 K/uL    nRBC 0 0 /100 WBC    Gran % 62.4 38.0 - 73.0 %    Lymph % 24.9 18.0 - 48.0 %    Mono % 8.0 4.0 - 15.0 %    Eosinophil % 3.2 0.0 - 8.0 %    Basophil % 0.8 0.0 - 1.9 %    Differential Method Automated    Comprehensive metabolic panel    Collection Time: 09/29/23  2:42 PM   Result Value Ref Range    Sodium 138 136 - 145  mmol/L    Potassium 4.0 3.5 - 5.1 mmol/L    Chloride 105 95 - 110 mmol/L    CO2 20 (L) 23 - 29 mmol/L    Glucose 144 (H) 70 - 110 mg/dL    BUN 15 6 - 20 mg/dL    Creatinine 0.9 0.5 - 1.4 mg/dL    Calcium 9.5 8.7 - 10.5 mg/dL    Total Protein 7.8 6.0 - 8.4 g/dL    Albumin 4.4 3.5 - 5.2 g/dL    Total Bilirubin 0.2 0.1 - 1.0 mg/dL    Alkaline Phosphatase 51 (L) 55 - 135 U/L    AST 41 (H) 10 - 40 U/L    ALT 67 (H) 10 - 44 U/L    eGFR >60 >60 mL/min/1.73 m^2    Anion Gap 13 8 - 16 mmol/L   Troponin I    Collection Time: 09/29/23  2:42 PM   Result Value Ref Range    Troponin I <0.006 0.000 - 0.026 ng/mL   Brain natriuretic peptide    Collection Time: 09/29/23  2:42 PM   Result Value Ref Range    BNP 11 0 - 99 pg/mL     Current Imaging Results:    X-Ray Chest AP Portable   Final Result      No acute cardiopulmonary process seen.         Electronically signed by: Jami Mansfield   Date:    09/29/2023   Time:    14:54          ECG: NSR. Normal ECG.  Assessment and Plan:     There are no hospital problems to display for this patient.    Assessment and Plan:    Chest Pain   9/20/2023: Began experience chest pain.   9/29/2023: Episode of central chest pain.   ECG WNL. Troponin x 1 negative.   Pain appears atypical but in setting of several risk factors.   Follow ECG's and troponin.   Do Echo.    2. Hypercholesterolemia   2020: Statin was begun.    3. Prediabetes   2022: Diagnosed.    4. Family History for Ischemic Heart Disease   Father underwent CABG at age 48.       VTE Risk Mitigation (From admission, onward)      None            Thank you for your consult.     I will follow-up with patient. Please contact us if you have any additional questions.    Dominick Quintero MD  Cardiology   [facility] - Emergency Dept

## 2023-09-29 NOTE — Clinical Note
"Caesar Randolphkd"Jose FranciscoWalsh was seen and treated in our emergency department on 9/29/2023.  He may return to work on 10/01/2023.       If you have any questions or concerns, please don't hesitate to call.      Kaur Beltran PA"

## 2023-09-30 ENCOUNTER — HOSPITAL ENCOUNTER (OUTPATIENT)
Dept: CARDIOLOGY | Facility: OTHER | Age: 53
Discharge: HOME | End: 2023-09-30
Attending: EMERGENCY MEDICINE | Admitting: EMERGENCY MEDICINE
Payer: COMMERCIAL

## 2023-09-30 VITALS
HEART RATE: 60 BPM | WEIGHT: 210 LBS | HEIGHT: 71 IN | SYSTOLIC BLOOD PRESSURE: 128 MMHG | TEMPERATURE: 98 F | DIASTOLIC BLOOD PRESSURE: 80 MMHG | OXYGEN SATURATION: 96 % | RESPIRATION RATE: 18 BRPM | BODY MASS INDEX: 29.4 KG/M2

## 2023-09-30 VITALS
WEIGHT: 210 LBS | SYSTOLIC BLOOD PRESSURE: 135 MMHG | DIASTOLIC BLOOD PRESSURE: 80 MMHG | BODY MASS INDEX: 29.4 KG/M2 | HEIGHT: 71 IN

## 2023-09-30 PROBLEM — R07.9 CHEST PAIN: Status: ACTIVE | Noted: 2023-09-30

## 2023-09-30 LAB
ALBUMIN SERPL BCP-MCNC: 4.2 G/DL (ref 3.5–5.2)
ALP SERPL-CCNC: 50 U/L (ref 55–135)
ALT SERPL W/O P-5'-P-CCNC: 63 U/L (ref 10–44)
ANION GAP SERPL CALC-SCNC: 10 MMOL/L (ref 8–16)
AORTIC ROOT ANNULUS: 3.46 CM
ASCENDING AORTA: 2.33 CM
AST SERPL-CCNC: 32 U/L (ref 10–40)
AV INDEX (PROSTH): 0.77
AV MEAN GRADIENT: 3 MMHG
AV PEAK GRADIENT: 5 MMHG
AV VALVE AREA BY VELOCITY RATIO: 2.79 CM²
AV VALVE AREA: 2.7 CM²
AV VELOCITY RATIO: 0.79
BASOPHILS # BLD AUTO: 0.07 K/UL (ref 0–0.2)
BASOPHILS NFR BLD: 0.8 % (ref 0–1.9)
BILIRUB SERPL-MCNC: 0.4 MG/DL (ref 0.1–1)
BSA FOR ECHO PROCEDURE: 2.18 M2
BUN SERPL-MCNC: 14 MG/DL (ref 6–20)
CALCIUM SERPL-MCNC: 9.7 MG/DL (ref 8.7–10.5)
CHLORIDE SERPL-SCNC: 108 MMOL/L (ref 95–110)
CO2 SERPL-SCNC: 21 MMOL/L (ref 23–29)
CREAT SERPL-MCNC: 0.8 MG/DL (ref 0.5–1.4)
CV ECHO LV RWT: 0.34 CM
DIFFERENTIAL METHOD: ABNORMAL
DOP CALC AO PEAK VEL: 1.15 M/S
DOP CALC AO VTI: 25.6 CM
DOP CALC LVOT AREA: 3.5 CM2
DOP CALC LVOT DIAMETER: 2.12 CM
DOP CALC LVOT PEAK VEL: 0.91 M/S
DOP CALC LVOT STROKE VOLUME: 69.15 CM3
DOP CALCLVOT PEAK VEL VTI: 19.6 CM
E WAVE DECELERATION TIME: 342.98 MSEC
E/A RATIO: 0.84
E/E' RATIO: 13 M/S
ECHO LV POSTERIOR WALL: 0.73 CM (ref 0.6–1.1)
EJECTION FRACTION: 65 %
EOSINOPHIL # BLD AUTO: 0.3 K/UL (ref 0–0.5)
EOSINOPHIL NFR BLD: 3.6 % (ref 0–8)
ERYTHROCYTE [DISTWIDTH] IN BLOOD BY AUTOMATED COUNT: 13.4 % (ref 11.5–14.5)
EST. GFR  (NO RACE VARIABLE): >60 ML/MIN/1.73 M^2
FRACTIONAL SHORTENING: 41 % (ref 28–44)
GLUCOSE SERPL-MCNC: 109 MG/DL (ref 70–110)
HCT VFR BLD AUTO: 45.3 % (ref 40–54)
HGB BLD-MCNC: 15.2 G/DL (ref 14–18)
IMM GRANULOCYTES # BLD AUTO: 0.05 K/UL (ref 0–0.04)
IMM GRANULOCYTES NFR BLD AUTO: 0.6 % (ref 0–0.5)
INTERVENTRICULAR SEPTUM: 0.79 CM (ref 0.6–1.1)
IVC DIAMETER: 1.88 CM
IVRT: 76.12 MSEC
LA MAJOR: 5.52 CM
LA MINOR: 5.4 CM
LA WIDTH: 3.2 CM
LAAS-AP2: 19 CM2
LAAS-AP4: 17 CM2
LEFT ATRIUM SIZE: 2.93 CM
LEFT ATRIUM VOLUME INDEX: 20.2 ML/M2
LEFT ATRIUM VOLUME: 43.51 CM3
LEFT INTERNAL DIMENSION IN SYSTOLE: 2.52 CM (ref 2.1–4)
LEFT VENTRICLE DIASTOLIC VOLUME INDEX: 38.6 ML/M2
LEFT VENTRICLE DIASTOLIC VOLUME: 82.99 ML
LEFT VENTRICLE MASS INDEX: 46 G/M2
LEFT VENTRICLE SYSTOLIC VOLUME INDEX: 10.6 ML/M2
LEFT VENTRICLE SYSTOLIC VOLUME: 22.7 ML
LEFT VENTRICULAR INTERNAL DIMENSION IN DIASTOLE: 4.3 CM (ref 3.5–6)
LEFT VENTRICULAR MASS: 98.47 G
LV LATERAL E/E' RATIO: 13 M/S
LV SEPTAL E/E' RATIO: 13 M/S
LVOT MG: 1.99 MMHG
LVOT MV: 0.69 CM/S
LYMPHOCYTES # BLD AUTO: 2.4 K/UL (ref 1–4.8)
LYMPHOCYTES NFR BLD: 27.4 % (ref 18–48)
MCH RBC QN AUTO: 29.6 PG (ref 27–31)
MCHC RBC AUTO-ENTMCNC: 33.6 G/DL (ref 32–36)
MCV RBC AUTO: 88 FL (ref 82–98)
MONOCYTES # BLD AUTO: 0.8 K/UL (ref 0.3–1)
MONOCYTES NFR BLD: 9.2 % (ref 4–15)
MV PEAK A VEL: 0.93 M/S
MV PEAK E VEL: 0.78 M/S
NEUTROPHILS # BLD AUTO: 5.1 K/UL (ref 1.8–7.7)
NEUTROPHILS NFR BLD: 58.4 % (ref 38–73)
NRBC BLD-RTO: 0 /100 WBC
PISA MRMAX VEL: 3.08 M/S
PLATELET # BLD AUTO: 293 K/UL (ref 150–450)
PMV BLD AUTO: 8.4 FL (ref 9.2–12.9)
POTASSIUM SERPL-SCNC: 4.1 MMOL/L (ref 3.5–5.1)
PROT SERPL-MCNC: 6.9 G/DL (ref 6–8.4)
PV MV: 0.96 M/S
PV PEAK GRADIENT: 7 MMHG
PV PEAK VELOCITY: 1.35 M/S
RA MAJOR: 4.48 CM
RA PRESSURE ESTIMATED: 3 MMHG
RA WIDTH: 1.9 CM
RBC # BLD AUTO: 5.14 M/UL (ref 4.6–6.2)
RIGHT VENTRICULAR END-DIASTOLIC DIMENSION: 1.86 CM
RV TISSUE DOPPLER FREE WALL SYSTOLIC VELOCITY 1 (APICAL 4 CHAMBER VIEW): 12.82 CM/S
SINUS: 2 CM
SODIUM SERPL-SCNC: 139 MMOL/L (ref 136–145)
STJ: 2.13 CM
TDI LATERAL: 0.06 M/S
TDI SEPTAL: 0.06 M/S
TDI: 0.06 M/S
TRICUSPID ANNULAR PLANE SYSTOLIC EXCURSION: 1.8 CM
TROPONIN I SERPL DL<=0.01 NG/ML-MCNC: <0.006 NG/ML (ref 0–0.03)
WBC # BLD AUTO: 8.65 K/UL (ref 3.9–12.7)
Z-SCORE OF LEFT VENTRICULAR DIMENSION IN END DIASTOLE: -4.84
Z-SCORE OF LEFT VENTRICULAR DIMENSION IN END SYSTOLE: -4.14

## 2023-09-30 PROCEDURE — G0378 HOSPITAL OBSERVATION PER HR: HCPCS

## 2023-09-30 PROCEDURE — 80053 COMPREHEN METABOLIC PANEL: CPT | Performed by: NURSE PRACTITIONER

## 2023-09-30 PROCEDURE — 99236 PR OBSERV/HOSP SAME DATE,LEVL V: ICD-10-PCS | Mod: 25,,, | Performed by: INTERNAL MEDICINE

## 2023-09-30 PROCEDURE — 99236 HOSP IP/OBS SAME DATE HI 85: CPT | Mod: 25,,, | Performed by: INTERNAL MEDICINE

## 2023-09-30 PROCEDURE — 93306 TTE W/DOPPLER COMPLETE: CPT

## 2023-09-30 PROCEDURE — 93306 ECHO (CUPID ONLY): ICD-10-PCS | Mod: 26,,, | Performed by: INTERNAL MEDICINE

## 2023-09-30 PROCEDURE — 84484 ASSAY OF TROPONIN QUANT: CPT | Performed by: NURSE PRACTITIONER

## 2023-09-30 PROCEDURE — 93306 TTE W/DOPPLER COMPLETE: CPT | Mod: 26,,, | Performed by: INTERNAL MEDICINE

## 2023-09-30 PROCEDURE — 25000003 PHARM REV CODE 250: Performed by: NURSE PRACTITIONER

## 2023-09-30 PROCEDURE — 85025 COMPLETE CBC W/AUTO DIFF WBC: CPT | Performed by: NURSE PRACTITIONER

## 2023-09-30 RX ORDER — PANTOPRAZOLE SODIUM 40 MG/1
40 TABLET, DELAYED RELEASE ORAL DAILY
Qty: 30 TABLET | Refills: 11 | Status: SHIPPED | OUTPATIENT
Start: 2023-10-01 | End: 2023-09-30 | Stop reason: SDUPTHER

## 2023-09-30 RX ORDER — ATORVASTATIN CALCIUM 80 MG/1
80 TABLET, FILM COATED ORAL DAILY
Qty: 90 TABLET | Refills: 3 | Status: SHIPPED | OUTPATIENT
Start: 2023-10-01 | End: 2024-09-30

## 2023-09-30 RX ORDER — ATORVASTATIN CALCIUM 80 MG/1
80 TABLET, FILM COATED ORAL DAILY
Qty: 90 TABLET | Refills: 3 | Status: SHIPPED | OUTPATIENT
Start: 2023-10-01 | End: 2023-09-30 | Stop reason: SDUPTHER

## 2023-09-30 RX ORDER — PANTOPRAZOLE SODIUM 40 MG/1
40 TABLET, DELAYED RELEASE ORAL DAILY
Qty: 30 TABLET | Refills: 11 | Status: SHIPPED | OUTPATIENT
Start: 2023-10-01 | End: 2024-09-30

## 2023-09-30 RX ADMIN — PANTOPRAZOLE SODIUM 40 MG: 40 TABLET, DELAYED RELEASE ORAL at 08:09

## 2023-09-30 RX ADMIN — ATORVASTATIN CALCIUM 80 MG: 20 TABLET, FILM COATED ORAL at 08:09

## 2023-09-30 NOTE — DISCHARGE SUMMARY
ED Observation Unit  Discharge Summary        History of Present Illness:    Caesar Walsh is a 52 y.o. male presenting with intermittent midsternal chest pain that began 2 weeks ago, described as dull sensation.  Worse over the past few days which prompted him to present to the emergency department.  States that he had a URI recently and had mild shortness breath but that has resolved.  Pain does not radiate down his arm or to his back.  He denies any nausea vomiting diarrhea or any fever/chills.  Past medical history includes hyperlipidemia, prediabetes.  Dad also had a quadruple bypass at 48 years old.  Hypertensive on arrival, no past medical history of hypertension      ED Course:  EKG normal sinus without any evidence of ischemia.  Initial troponin normal.  No leukocytosis, stable H&H.  No significant electrolyte abnormalities.  Chest x-ray negative.  I spoke with on-call Cardiology who recommended an ECHO in the AM.  Patient will be admitted to the EDOU for further treatment and monitoring.       Patient mentions that he had a sharp pain in his leg last week preceding the chest pain. Will get a Ddimer with plan for CTA if positive,. Ddimer normal.     I reviewed the ED Provider Note dated 09/29/2023 prior to my evaluation of this patient.  I reviewed all labs and imaging performed in the Main ED, prior to patient being placed in Observation. Patient was placed in the ED Observation Unit for chest pain rule out.    Observation Course:    Uneventful; WNL echo; mild diastolic dysfunction. Recommend OP stress    Consultants:    cardiology    Final Diagnosis:  1. Chest pain    2. Shortness of breath          Discharge Condition: Good    Disposition: Home or Self Care     Time spent on the discharge of the patient including review of hospital course with the patient. reviewing discharge medications and arranging follow-up care 35 minutes.  Patient was seen and examined on the date of discharge and  determined to be suitable for discharge.    Follow Up:  No future appointments.

## 2023-09-30 NOTE — PROGRESS NOTES
ED Observation Unit  Progress Note      HPI   Caesar Walsh is a 52 y.o. male presenting with intermittent midsternal chest pain that began 2 weeks ago, described as dull sensation.  Worse over the past few days which prompted him to present to the emergency department.  States that he had a URI recently and had mild shortness breath but that has resolved.  Pain does not radiate down his arm or to his back.  He denies any nausea vomiting diarrhea or any fever/chills.  Past medical history includes hyperlipidemia, prediabetes.  Dad also had a quadruple bypass at 48 years old.  Hypertensive on arrival, no past medical history of hypertension      ED Course:  EKG normal sinus without any evidence of ischemia.  Initial troponin normal.  No leukocytosis, stable H&H.  No significant electrolyte abnormalities.  Chest x-ray negative.  I spoke with on-call Cardiology who recommended an ECHO in the AM.  Patient will be admitted to the EDOU for further treatment and monitoring.       Patient mentions that he had a sharp pain in his leg last week preceding the chest pain. Will get a Ddimer with plan for CTA if positive,. Ddimer normal.     I reviewed the ED Provider Note dated 09/29/2023 prior to my evaluation of this patient.  I reviewed all labs and imaging performed in the Main ED, prior to patient being placed in Observation. Patient was placed in the ED Observation Unit for chest pain rule out.    Interval History    No recurrence pain    PMHx   History reviewed. No pertinent past medical history.   History reviewed. No pertinent surgical history.     Family Hx   History reviewed. No pertinent family history.     Social Hx   Social History     Socioeconomic History    Marital status: Single   Tobacco Use    Smoking status: Never    Smokeless tobacco: Never   Substance and Sexual Activity    Alcohol use: Never    Drug use: Never        Vital Signs   Vitals:    09/30/23 0700 09/30/23 0800 09/30/23 1008 09/30/23  1152   BP:  135/80  128/80   BP Location:    Left arm   Patient Position:  Lying  Lying   Pulse: 62 60 73 60   Resp:       Temp:  97.5 °F (36.4 °C)  97.5 °F (36.4 °C)   TempSrc:  Oral  Oral   SpO2:  95%  96%   Weight:       Height:            Review of Systems  As per HPI    Brief Physical Exam/Reassessment   Physical Exam  Vitals and nursing note reviewed.   Constitutional:       General: He is not in acute distress.     Appearance: He is well-developed. He is not ill-appearing.   HENT:      Head: Normocephalic and atraumatic.   Cardiovascular:      Rate and Rhythm: Normal rate and regular rhythm.   Pulmonary:      Effort: Pulmonary effort is normal. No tachypnea.      Breath sounds: No wheezing or rhonchi.   Abdominal:      Palpations: Abdomen is soft.   Musculoskeletal:         General: Normal range of motion.      Cervical back: Normal range of motion.   Skin:     General: Skin is warm and dry.   Neurological:      General: No focal deficit present.      Mental Status: He is oriented to person, place, and time.   Psychiatric:         Mood and Affect: Mood normal.         Behavior: Behavior normal.         Labs/Imaging   Labs Reviewed   CBC W/ AUTO DIFFERENTIAL - Abnormal; Notable for the following components:       Result Value    MPV 8.6 (*)     Immature Granulocytes 0.7 (*)     Immature Grans (Abs) 0.06 (*)     All other components within normal limits   COMPREHENSIVE METABOLIC PANEL - Abnormal; Notable for the following components:    CO2 20 (*)     Glucose 144 (*)     Alkaline Phosphatase 51 (*)     AST 41 (*)     ALT 67 (*)     All other components within normal limits   LIPID PANEL - Abnormal; Notable for the following components:    Triglycerides 384 (*)     HDL 38 (*)     LDL Cholesterol 49.2 (*)     All other components within normal limits   CBC W/ AUTO DIFFERENTIAL - Abnormal; Notable for the following components:    MPV 8.4 (*)     Immature Granulocytes 0.6 (*)     Immature Grans (Abs) 0.05 (*)      All other components within normal limits   COMPREHENSIVE METABOLIC PANEL - Abnormal; Notable for the following components:    CO2 21 (*)     Alkaline Phosphatase 50 (*)     ALT 63 (*)     All other components within normal limits   TROPONIN I   B-TYPE NATRIURETIC PEPTIDE   TROPONIN I   D DIMER, QUANTITATIVE   TROPONIN I      Imaging Results              X-Ray Chest AP Portable (Final result)  Result time 09/29/23 14:54:41      Final result by Jami Mansfield MD (09/29/23 14:54:41)                   Impression:      No acute cardiopulmonary process seen.      Electronically signed by: Jami Mansfield  Date:    09/29/2023  Time:    14:54               Narrative:    EXAMINATION:  XR CHEST AP PORTABLE    CLINICAL HISTORY:  CHF;    TECHNIQUE:  Single frontal view of the chest was performed.    COMPARISON:  None    FINDINGS:  Lung volumes are reduced.  No acute consolidation, pleural effusion, or pneumothorax.    Cardiac silhouette is likely normal in size when accounting for AP projection and low lung volumes.                                       I reviewed all labs, imaging, EKGs.     Plan   1. Chest pain    2. Shortness of breath          I have discussed this case with cardiology.

## 2023-09-30 NOTE — PROGRESS NOTES
[facility] Emergency Dept (Observation)  Cardiology  Progress Note    Patient Name: Caesar Walsh  MRN: [id number]  Admission Date: 9/29/2023  Hospital Length of Stay: 0 days  Code Status: Full Code   Attending Physician: No att. providers found   Primary Care Physician: Isaac Lamb MD  Expected Discharge Date:   Principal Problem:<principal problem not specified>    Subjective:     Brief HPI:    Caesar Walsh is a 52 y.o.male with hypercholesterolemia and prediabetes. He is overweight. He had an upper respiratory infection in early 9/2023. On about 9/20/2023 he began experience occasional left as well right sided brief episodes of chest pain. The pain could come on at any time and did not appear related to activities. He exercised on 9/26/2023 and felt well. Today on 9/29/2023 he began experience burning in his central chest after a meal. He became concerned that the pain was coming from his heart and presented to the emergency room. He was hypertensive on arrival.    Hospital Course:     Observation.    9/30/2023: Echo: Normal left ventricular size and systolic function. EF 65%. Mild diastolic dysfunction.    Interval History:     No further chest pain.    Wonder if he has had indigestion.    Review of Systems   Constitutional: Negative for chills, fever and malaise/fatigue.   HENT:  Negative for nosebleeds.    Eyes:  Negative for vision loss in left eye and vision loss in right eye.   Cardiovascular:  Negative for chest pain, leg swelling, orthopnea, palpitations and paroxysmal nocturnal dyspnea.   Respiratory:  Negative for cough, hemoptysis, shortness of breath, sputum production and wheezing.    Hematologic/Lymphatic: Negative for bleeding problem. Does not bruise/bleed easily.   Skin:  Negative for color change and rash.   Musculoskeletal:  Negative for muscle weakness and myalgias.   Gastrointestinal:  Negative for abdominal pain, heartburn, hematemesis, hematochezia, melena,  nausea and vomiting.   Genitourinary:  Negative for hematuria.   Neurological:  Negative for dizziness, focal weakness, headaches, light-headedness, vertigo and weakness.   Psychiatric/Behavioral:  Negative for altered mental status. The patient is not nervous/anxious.    Allergic/Immunologic: Negative for persistent infections.     Objective:     Vital Signs (Most Recent):  Temp: 97.5 °F (36.4 °C) (09/30/23 1152)  Pulse: 60 (09/30/23 1152)  Resp: 18 (09/30/23 0420)  BP: 128/80 (09/30/23 1152)  SpO2: 96 % (09/30/23 1152) Vital Signs (24h Range):  Temp:  [97.5 °F (36.4 °C)-98.2 °F (36.8 °C)] 97.5 °F (36.4 °C)  Pulse:  [60-90] 60  Resp:  [18-25] 18  SpO2:  [95 %-97 %] 96 %  BP: (125-197)/() 128/80     Weight: 95.3 kg (210 lb)  Body mass index is 29.29 kg/m².    SpO2: 96 %       No intake or output data in the 24 hours ending 09/30/23 1204    Lines/Drains/Airways       Peripheral Intravenous Line  Duration                  Peripheral IV - Single Lumen 09/29/23 1438 20 G Left Antecubital <1 day                    Physical Exam  Constitutional:       General: He is not in acute distress.     Appearance: Normal appearance. He is well-developed. He is not ill-appearing.   Eyes:      Conjunctiva/sclera:      Right eye: Right conjunctiva is not injected. No hemorrhage.     Left eye: Left conjunctiva is not injected. No hemorrhage.     Pupils:      Right eye: Pupil is round.      Left eye: Pupil is round.   Neck:      Vascular: No JVD.   Cardiovascular:      Rate and Rhythm: Normal rate and regular rhythm.      Heart sounds: S1 normal and S2 normal.   Pulmonary:      Effort: Pulmonary effort is normal.      Breath sounds: Normal breath sounds.   Chest:      Chest wall: No swelling or tenderness.   Abdominal:      General: There is no distension.      Palpations: Abdomen is soft.      Tenderness: There is no abdominal tenderness.   Musculoskeletal:      Cervical back: Neck supple.      Right ankle: No swelling.      Left  ankle: No swelling.   Skin:     General: Skin is warm and dry.      Findings: No rash.   Neurological:      Mental Status: He is alert and oriented to person, place, and time. He is not disoriented.   Psychiatric:         Attention and Perception: Attention normal.         Mood and Affect: Mood normal.         Speech: Speech normal.         Behavior: Behavior normal.         Thought Content: Thought content normal.         Cognition and Memory: Cognition and memory normal.         Judgment: Judgment normal.         Current Medications:     atorvastatin  80 mg Oral Daily    pantoprazole  40 mg Oral Daily     Current Laboratory Results:    Recent Results (from the past 24 hour(s))   CBC auto differential    Collection Time: 09/29/23  2:42 PM   Result Value Ref Range    WBC 8.50 3.90 - 12.70 K/uL    RBC 5.10 4.60 - 6.20 M/uL    Hemoglobin 15.0 14.0 - 18.0 g/dL    Hematocrit 45.2 40.0 - 54.0 %    MCV 89 82 - 98 fL    MCH 29.4 27.0 - 31.0 pg    MCHC 33.2 32.0 - 36.0 g/dL    RDW 13.2 11.5 - 14.5 %    Platelets 371 150 - 450 K/uL    MPV 8.6 (L) 9.2 - 12.9 fL    Immature Granulocytes 0.7 (H) 0.0 - 0.5 %    Gran # (ANC) 5.3 1.8 - 7.7 K/uL    Immature Grans (Abs) 0.06 (H) 0.00 - 0.04 K/uL    Lymph # 2.1 1.0 - 4.8 K/uL    Mono # 0.7 0.3 - 1.0 K/uL    Eos # 0.3 0.0 - 0.5 K/uL    Baso # 0.07 0.00 - 0.20 K/uL    nRBC 0 0 /100 WBC    Gran % 62.4 38.0 - 73.0 %    Lymph % 24.9 18.0 - 48.0 %    Mono % 8.0 4.0 - 15.0 %    Eosinophil % 3.2 0.0 - 8.0 %    Basophil % 0.8 0.0 - 1.9 %    Differential Method Automated    Comprehensive metabolic panel    Collection Time: 09/29/23  2:42 PM   Result Value Ref Range    Sodium 138 136 - 145 mmol/L    Potassium 4.0 3.5 - 5.1 mmol/L    Chloride 105 95 - 110 mmol/L    CO2 20 (L) 23 - 29 mmol/L    Glucose 144 (H) 70 - 110 mg/dL    BUN 15 6 - 20 mg/dL    Creatinine 0.9 0.5 - 1.4 mg/dL    Calcium 9.5 8.7 - 10.5 mg/dL    Total Protein 7.8 6.0 - 8.4 g/dL    Albumin 4.4 3.5 - 5.2 g/dL    Total Bilirubin  0.2 0.1 - 1.0 mg/dL    Alkaline Phosphatase 51 (L) 55 - 135 U/L    AST 41 (H) 10 - 40 U/L    ALT 67 (H) 10 - 44 U/L    eGFR >60 >60 mL/min/1.73 m^2    Anion Gap 13 8 - 16 mmol/L   Troponin I    Collection Time: 09/29/23  2:42 PM   Result Value Ref Range    Troponin I <0.006 0.000 - 0.026 ng/mL   Brain natriuretic peptide    Collection Time: 09/29/23  2:42 PM   Result Value Ref Range    BNP 11 0 - 99 pg/mL   Lipid panel    Collection Time: 09/29/23  2:44 PM   Result Value Ref Range    Cholesterol 164 120 - 199 mg/dL    Triglycerides 384 (H) 30 - 150 mg/dL    HDL 38 (L) 40 - 75 mg/dL    LDL Cholesterol 49.2 (L) 63.0 - 159.0 mg/dL    HDL/Cholesterol Ratio 23.2 20.0 - 50.0 %    Total Cholesterol/HDL Ratio 4.3 2.0 - 5.0    Non-HDL Cholesterol 126 mg/dL   Troponin I    Collection Time: 09/29/23  7:11 PM   Result Value Ref Range    Troponin I <0.006 0.000 - 0.026 ng/mL   D dimer, quantitative    Collection Time: 09/29/23  7:11 PM   Result Value Ref Range    D-Dimer 0.19 <0.50 mg/L FEU   Troponin I    Collection Time: 09/30/23  2:37 AM   Result Value Ref Range    Troponin I <0.006 0.000 - 0.026 ng/mL   CBC auto differential    Collection Time: 09/30/23  6:24 AM   Result Value Ref Range    WBC 8.65 3.90 - 12.70 K/uL    RBC 5.14 4.60 - 6.20 M/uL    Hemoglobin 15.2 14.0 - 18.0 g/dL    Hematocrit 45.3 40.0 - 54.0 %    MCV 88 82 - 98 fL    MCH 29.6 27.0 - 31.0 pg    MCHC 33.6 32.0 - 36.0 g/dL    RDW 13.4 11.5 - 14.5 %    Platelets 293 150 - 450 K/uL    MPV 8.4 (L) 9.2 - 12.9 fL    Immature Granulocytes 0.6 (H) 0.0 - 0.5 %    Gran # (ANC) 5.1 1.8 - 7.7 K/uL    Immature Grans (Abs) 0.05 (H) 0.00 - 0.04 K/uL    Lymph # 2.4 1.0 - 4.8 K/uL    Mono # 0.8 0.3 - 1.0 K/uL    Eos # 0.3 0.0 - 0.5 K/uL    Baso # 0.07 0.00 - 0.20 K/uL    nRBC 0 0 /100 WBC    Gran % 58.4 38.0 - 73.0 %    Lymph % 27.4 18.0 - 48.0 %    Mono % 9.2 4.0 - 15.0 %    Eosinophil % 3.6 0.0 - 8.0 %    Basophil % 0.8 0.0 - 1.9 %    Differential Method Automated     Comprehensive metabolic panel    Collection Time: 09/30/23  6:24 AM   Result Value Ref Range    Sodium 139 136 - 145 mmol/L    Potassium 4.1 3.5 - 5.1 mmol/L    Chloride 108 95 - 110 mmol/L    CO2 21 (L) 23 - 29 mmol/L    Glucose 109 70 - 110 mg/dL    BUN 14 6 - 20 mg/dL    Creatinine 0.8 0.5 - 1.4 mg/dL    Calcium 9.7 8.7 - 10.5 mg/dL    Total Protein 6.9 6.0 - 8.4 g/dL    Albumin 4.2 3.5 - 5.2 g/dL    Total Bilirubin 0.4 0.1 - 1.0 mg/dL    Alkaline Phosphatase 50 (L) 55 - 135 U/L    AST 32 10 - 40 U/L    ALT 63 (H) 10 - 44 U/L    eGFR >60 >60 mL/min/1.73 m^2    Anion Gap 10 8 - 16 mmol/L   Echo    Collection Time: 09/30/23  9:49 AM   Result Value Ref Range    BSA 2.18 m2    LVOT stroke volume 69.15 cm3    LVIDd 4.30 3.5 - 6.0 cm    LV Systolic Volume 22.70 mL    LV Systolic Volume Index 10.6 mL/m2    LVIDs 2.52 2.1 - 4.0 cm    LV Diastolic Volume 82.99 mL    LV Diastolic Volume Index 38.60 mL/m2    IVS 0.79 0.6 - 1.1 cm    LVOT diameter 2.12 cm    LVOT area 3.5 cm2    FS 41 28 - 44 %    Left Ventricle Relative Wall Thickness 0.34 cm    Posterior Wall 0.73 0.6 - 1.1 cm    LV mass 98.47 g    LV Mass Index 46 g/m2    MV Peak E Zachary 0.78 m/s    TDI LATERAL 0.06 m/s    TDI SEPTAL 0.06 m/s    E/E' ratio 13.00 m/s    MV Peak A Zachary 0.93 m/s    E/A ratio 0.84     IVRT 76.12 msec    E wave deceleration time 342.98 msec    LV SEPTAL E/E' RATIO 13.00 m/s    LV LATERAL E/E' RATIO 13.00 m/s    LVOT peak zachary 0.91 m/s    Left Ventricular Outflow Tract Mean Velocity 0.69 cm/s    Left Ventricular Outflow Tract Mean Gradient 1.99 mmHg    LA size 2.93 cm    Left Atrium Minor Axis 5.40 cm    Left Atrium Major Axis 5.52 cm    RVDD 1.86 cm    RV S' 12.82 cm/s    RA Major Axis 4.48 cm    AV mean gradient 3 mmHg    AV peak gradient 5 mmHg    Ao peak zachary 1.15 m/s    Ao VTI 25.60 cm    LVOT peak VTI 19.60 cm    AV valve area 2.70 cm²    AV Velocity Ratio 0.79     AV index (prosthetic) 0.77     MARK by Velocity Ratio 2.79 cm²    Mr max zachary  3.08 m/s    PV PEAK VELOCITY 1.35 m/s    PV peak gradient 7 mmHg    Pulmonary Valve Mean Velocity 0.96 m/s    Ao root annulus 3.46 cm    STJ 2.13 cm    Ascending aorta 2.33 cm    IVC diameter 1.88 cm    Mean e' 0.06 m/s    ZLVIDS -4.14     ZLVIDD -4.84     LA Volume Index 20.2 mL/m2    LA volume 43.51 cm3    LA WIDTH 3.2 cm    Left Atrium Area-systolic Apical Two Chamber 19.0 cm2    Left Atrium Area-systolic Four Chamber 17.0 cm2    TAPSE 1.80 cm    RA Width 1.9 cm    Sinus 2.0 cm    Est. RA pres 3 mmHg    EF 65 %     Current Imaging Results:    X-Ray Chest AP Portable   Final Result      No acute cardiopulmonary process seen.         Electronically signed by: Jami Mansfield   Date:    09/29/2023   Time:    14:54        9/30/2023: Echo:    Left Ventricle: The left ventricle is normal in size. Ventricular mass is normal. Normal wall thickness. Normal wall motion. There is normal systolic function. Ejection fraction by visual approximation is 65%. Grade I diastolic dysfunction.  Right Ventricle: Normal right ventricular cavity size. Wall thickness is normal. Right ventricle wall motion  is normal. Systolic function is normal.    Assessment and Plan:     Problem List:    There are no hospital problems to display for this patient.    Assessment and Plan:     Chest Pain              9/20/2023: Began experience chest pain.              9/29/2023: Episode of central chest pain.   9/30/2023: Echo: Normal left ventricular size and systolic function. EF 65%. Mild diastolic dysfunction.              ECG WNL. Troponin x 3 negative.              Pain appeared atypical but in setting of several risk factors.              An outpatient Stress ECG appears reasonable.   May consider PPI.     2. Hypercholesterolemia              2020: Statin was begun.   On rosuvastatin 20 mg Q24.   9/29/2023: Chol 164. HDL 38. LDL 49. .  On rosuvastatin 20 mg Q24.  Very favorable LDL.    3. Hypertriglyceridemia   9/29/2023: .   Diet,  exercise and weight loss.     4. Prediabetes              2022: Diagnosed.     5. Family History for Ischemic Heart Disease              Father underwent CABG at age 48.      VTE Risk Mitigation (From admission, onward)           Ordered     IP VTE LOW RISK PATIENT  Once         09/29/23 2527                    Dominick Quintero MD  Cardiology  [facility] - Emergency Dept (Observation)

## 2023-09-30 NOTE — ED NOTES
Pt rounding complete.  Pain 0/10. Respirations even and unlabored. Aaox4. Denies needs at this time.  Restroom and comfort needs addressed.  Pt updated on plan of care.  Call light within reach.  Will continue to monitor.

## 2023-10-02 ENCOUNTER — TELEPHONE (OUTPATIENT)
Dept: ADMINISTRATIVE | Facility: OTHER | Age: 53
End: 2023-10-02
Payer: COMMERCIAL

## 2023-10-02 NOTE — TELEPHONE ENCOUNTER
Unable to reach patient by phone to discuss scheduled Cardiology appointment of 10-18-23. Appointment letter to be mailed.

## 2023-10-18 PROBLEM — E66.3 OVERWEIGHT (BMI 25.0-29.9): Status: ACTIVE | Noted: 2023-10-18

## 2023-10-18 PROBLEM — R73.03 PREDIABETES: Status: ACTIVE | Noted: 2023-10-18

## 2023-10-18 PROBLEM — Z82.49 FAMILY HISTORY OF PREMATURE CORONARY ARTERY DISEASE: Status: ACTIVE | Noted: 2023-10-18

## 2023-10-18 PROBLEM — E78.5 DYSLIPIDEMIA: Status: ACTIVE | Noted: 2023-10-18

## 2023-10-26 ENCOUNTER — TELEPHONE (OUTPATIENT)
Dept: ADMINISTRATIVE | Facility: OTHER | Age: 53
End: 2023-10-26
Payer: COMMERCIAL

## 2023-10-26 NOTE — TELEPHONE ENCOUNTER
Unable to reach patient with rescheduled Cardiology appointment of 11-15-23. Appointment letter to be mailed.